# Patient Record
Sex: MALE | Race: WHITE | Employment: FULL TIME | URBAN - METROPOLITAN AREA
[De-identification: names, ages, dates, MRNs, and addresses within clinical notes are randomized per-mention and may not be internally consistent; named-entity substitution may affect disease eponyms.]

---

## 2018-11-05 ENCOUNTER — HOSPITAL ENCOUNTER (OUTPATIENT)
Dept: LAB | Age: 48
Discharge: HOME | End: 2018-11-05
Payer: COMMERCIAL

## 2018-11-05 LAB
ALBUMIN SERPL-MCNC: 4.4 G/DL (ref 3.9–4.9)
ALP BLD-CCNC: 56 U/L (ref 35–104)
ALT SERPL-CCNC: 26 U/L (ref 0–41)
ANION GAP SERPL CALCULATED.3IONS-SCNC: 13 MEQ/L (ref 7–13)
AST SERPL-CCNC: 18 U/L (ref 0–40)
BASOPHILS ABSOLUTE: 0 K/UL (ref 0–0.2)
BASOPHILS RELATIVE PERCENT: 0.6 %
BILIRUB SERPL-MCNC: 0.8 MG/DL (ref 0–1.2)
BUN BLDV-MCNC: 14 MG/DL (ref 6–20)
CALCIUM SERPL-MCNC: 9.3 MG/DL (ref 8.6–10.2)
CHLORIDE BLD-SCNC: 104 MEQ/L (ref 98–107)
CHOLESTEROL, TOTAL: 172 MG/DL (ref 0–199)
CO2: 23 MEQ/L (ref 22–29)
CREAT SERPL-MCNC: 0.68 MG/DL (ref 0.7–1.2)
EOSINOPHILS ABSOLUTE: 0.1 K/UL (ref 0–0.7)
EOSINOPHILS RELATIVE PERCENT: 1.2 %
GFR AFRICAN AMERICAN: >60
GFR NON-AFRICAN AMERICAN: >60
GLOBULIN: 2.6 G/DL (ref 2.3–3.5)
GLUCOSE BLD-MCNC: 99 MG/DL (ref 74–109)
HCT VFR BLD CALC: 44.7 % (ref 42–52)
HDLC SERPL-MCNC: 35 MG/DL (ref 40–59)
HEMOGLOBIN: 15.7 G/DL (ref 14–18)
LDL CHOLESTEROL CALCULATED: 120 MG/DL (ref 0–129)
LYMPHOCYTES ABSOLUTE: 1.8 K/UL (ref 1–4.8)
LYMPHOCYTES RELATIVE PERCENT: 25.7 %
MCH RBC QN AUTO: 31.8 PG (ref 27–31.3)
MCHC RBC AUTO-ENTMCNC: 35 % (ref 33–37)
MCV RBC AUTO: 90.9 FL (ref 80–100)
MONOCYTES ABSOLUTE: 0.6 K/UL (ref 0.2–0.8)
MONOCYTES RELATIVE PERCENT: 8.4 %
NEUTROPHILS ABSOLUTE: 4.4 K/UL (ref 1.4–6.5)
NEUTROPHILS RELATIVE PERCENT: 64.1 %
PDW BLD-RTO: 12.1 % (ref 11.5–14.5)
PLATELET # BLD: 320 K/UL (ref 130–400)
POTASSIUM SERPL-SCNC: 4.7 MEQ/L (ref 3.5–5.1)
RBC # BLD: 4.92 M/UL (ref 4.7–6.1)
SODIUM BLD-SCNC: 140 MEQ/L (ref 132–144)
T4 FREE: 1.16 NG/DL (ref 0.93–1.7)
TOTAL PROTEIN: 7 G/DL (ref 6.4–8.1)
TRIGL SERPL-MCNC: 85 MG/DL (ref 0–200)
TSH SERPL DL<=0.05 MIU/L-ACNC: 1.24 UIU/ML (ref 0.27–4.2)
VITAMIN D 25-HYDROXY: 19.1 NG/ML (ref 30–100)
WBC # BLD: 6.9 K/UL (ref 4.8–10.8)

## 2018-11-05 PROCEDURE — 80053 COMPREHEN METABOLIC PANEL: CPT

## 2018-11-05 PROCEDURE — 80061 LIPID PANEL: CPT

## 2018-11-05 PROCEDURE — 84403 ASSAY OF TOTAL TESTOSTERONE: CPT

## 2018-11-05 PROCEDURE — 85025 COMPLETE CBC W/AUTO DIFF WBC: CPT

## 2018-11-05 PROCEDURE — 84439 ASSAY OF FREE THYROXINE: CPT

## 2018-11-05 PROCEDURE — 36415 COLL VENOUS BLD VENIPUNCTURE: CPT

## 2018-11-05 PROCEDURE — 82306 VITAMIN D 25 HYDROXY: CPT

## 2018-11-05 PROCEDURE — 84443 ASSAY THYROID STIM HORMONE: CPT

## 2018-11-07 LAB — TESTOSTERONE TOTAL-MALE: 854 NG/DL (ref 300–890)

## 2019-06-17 ENCOUNTER — HOSPITAL ENCOUNTER (OUTPATIENT)
Dept: LAB | Age: 49
Discharge: HOME | End: 2019-06-17
Payer: COMMERCIAL

## 2019-06-17 LAB
ALBUMIN SERPL-MCNC: 4.7 G/DL (ref 3.5–4.6)
ALP BLD-CCNC: 61 U/L (ref 35–104)
ALT SERPL-CCNC: 28 U/L (ref 0–41)
ANION GAP SERPL CALCULATED.3IONS-SCNC: 12 MEQ/L (ref 9–15)
AST SERPL-CCNC: 18 U/L (ref 0–40)
BILIRUB SERPL-MCNC: 0.8 MG/DL (ref 0.2–0.7)
BUN BLDV-MCNC: 11 MG/DL (ref 6–20)
CALCIUM SERPL-MCNC: 9.1 MG/DL (ref 8.5–9.9)
CHLORIDE BLD-SCNC: 101 MEQ/L (ref 95–107)
CHOLESTEROL, TOTAL: 154 MG/DL (ref 0–199)
CO2: 25 MEQ/L (ref 20–31)
CREAT SERPL-MCNC: 0.64 MG/DL (ref 0.7–1.2)
GFR AFRICAN AMERICAN: >60
GFR NON-AFRICAN AMERICAN: >60
GLOBULIN: 2.9 G/DL (ref 2.3–3.5)
GLUCOSE BLD-MCNC: 116 MG/DL (ref 70–99)
HDLC SERPL-MCNC: 35 MG/DL (ref 40–59)
LDL CHOLESTEROL CALCULATED: 100 MG/DL (ref 0–129)
MAGNESIUM: 2.3 MG/DL (ref 1.7–2.4)
POTASSIUM SERPL-SCNC: 4.6 MEQ/L (ref 3.4–4.9)
SODIUM BLD-SCNC: 138 MEQ/L (ref 135–144)
T4 FREE: 1.15 NG/DL (ref 0.84–1.68)
TOTAL PROTEIN: 7.6 G/DL (ref 6.3–8)
TRIGL SERPL-MCNC: 94 MG/DL (ref 0–150)
TSH SERPL DL<=0.05 MIU/L-ACNC: 1.63 UIU/ML (ref 0.44–3.86)
VITAMIN D 25-HYDROXY: 37.1 NG/ML (ref 30–100)

## 2019-06-17 PROCEDURE — 80061 LIPID PANEL: CPT

## 2019-06-17 PROCEDURE — 84443 ASSAY THYROID STIM HORMONE: CPT

## 2019-06-17 PROCEDURE — 82306 VITAMIN D 25 HYDROXY: CPT

## 2019-06-17 PROCEDURE — 80053 COMPREHEN METABOLIC PANEL: CPT

## 2019-06-17 PROCEDURE — 83735 ASSAY OF MAGNESIUM: CPT

## 2019-06-17 PROCEDURE — 84439 ASSAY OF FREE THYROXINE: CPT

## 2019-06-17 PROCEDURE — 36415 COLL VENOUS BLD VENIPUNCTURE: CPT

## 2020-01-03 ENCOUNTER — HOSPITAL ENCOUNTER (OUTPATIENT)
Dept: LAB | Age: 50
Discharge: HOME | End: 2020-01-03
Payer: COMMERCIAL

## 2020-01-03 LAB
ALBUMIN SERPL-MCNC: 4.2 G/DL (ref 3.5–4.6)
ALP BLD-CCNC: 49 U/L (ref 35–104)
ALT SERPL-CCNC: 20 U/L (ref 0–41)
ANION GAP SERPL CALCULATED.3IONS-SCNC: 13 MEQ/L (ref 9–15)
AST SERPL-CCNC: 14 U/L (ref 0–40)
BILIRUB SERPL-MCNC: 1.2 MG/DL (ref 0.2–0.7)
BUN BLDV-MCNC: 16 MG/DL (ref 6–20)
CALCIUM SERPL-MCNC: 9.2 MG/DL (ref 8.5–9.9)
CHLORIDE BLD-SCNC: 99 MEQ/L (ref 95–107)
CHOLESTEROL, TOTAL: 158 MG/DL (ref 0–199)
CO2: 25 MEQ/L (ref 20–31)
CREAT SERPL-MCNC: 0.7 MG/DL (ref 0.7–1.2)
GFR AFRICAN AMERICAN: >60
GFR NON-AFRICAN AMERICAN: >60
GLOBULIN: 2.7 G/DL (ref 2.3–3.5)
GLUCOSE BLD-MCNC: 96 MG/DL (ref 70–99)
HDLC SERPL-MCNC: 32 MG/DL (ref 40–59)
LDL CHOLESTEROL CALCULATED: 102 MG/DL (ref 0–129)
POTASSIUM SERPL-SCNC: 4.4 MEQ/L (ref 3.4–4.9)
SODIUM BLD-SCNC: 137 MEQ/L (ref 135–144)
TOTAL PROTEIN: 6.9 G/DL (ref 6.3–8)
TRIGL SERPL-MCNC: 118 MG/DL (ref 0–150)

## 2020-01-03 PROCEDURE — 80061 LIPID PANEL: CPT

## 2020-01-03 PROCEDURE — 36415 COLL VENOUS BLD VENIPUNCTURE: CPT

## 2020-01-03 PROCEDURE — 80053 COMPREHEN METABOLIC PANEL: CPT

## 2020-12-29 ENCOUNTER — HOSPITAL ENCOUNTER (OUTPATIENT)
Dept: LAB | Age: 50
Discharge: HOME | End: 2020-12-29
Payer: COMMERCIAL

## 2020-12-29 LAB
ALBUMIN SERPL-MCNC: 4.4 G/DL (ref 3.5–4.6)
ALP BLD-CCNC: 56 U/L (ref 35–104)
ALT SERPL-CCNC: 23 U/L (ref 0–41)
ANION GAP SERPL CALCULATED.3IONS-SCNC: 11 MEQ/L (ref 9–15)
AST SERPL-CCNC: 17 U/L (ref 0–40)
BILIRUB SERPL-MCNC: 1.1 MG/DL (ref 0.2–0.7)
BUN BLDV-MCNC: 10 MG/DL (ref 6–20)
CALCIUM SERPL-MCNC: 9.6 MG/DL (ref 8.5–9.9)
CHLORIDE BLD-SCNC: 98 MEQ/L (ref 95–107)
CHOLESTEROL, TOTAL: 162 MG/DL (ref 0–199)
CO2: 30 MEQ/L (ref 20–31)
CREAT SERPL-MCNC: 0.82 MG/DL (ref 0.7–1.2)
GFR AFRICAN AMERICAN: >60
GFR NON-AFRICAN AMERICAN: >60
GLOBULIN: 2.6 G/DL (ref 2.3–3.5)
GLUCOSE BLD-MCNC: 91 MG/DL (ref 70–99)
HDLC SERPL-MCNC: 43 MG/DL (ref 40–59)
LDL CHOLESTEROL CALCULATED: 98 MG/DL (ref 0–129)
POTASSIUM SERPL-SCNC: 4.1 MEQ/L (ref 3.4–4.9)
SODIUM BLD-SCNC: 139 MEQ/L (ref 135–144)
TOTAL PROTEIN: 7 G/DL (ref 6.3–8)
TRIGL SERPL-MCNC: 106 MG/DL (ref 0–150)
VITAMIN D 25-HYDROXY: 23 NG/ML (ref 30–100)

## 2020-12-29 PROCEDURE — 82306 VITAMIN D 25 HYDROXY: CPT

## 2020-12-29 PROCEDURE — 80061 LIPID PANEL: CPT

## 2020-12-29 PROCEDURE — 36415 COLL VENOUS BLD VENIPUNCTURE: CPT

## 2020-12-29 PROCEDURE — 80053 COMPREHEN METABOLIC PANEL: CPT

## 2022-05-27 ENCOUNTER — HOSPITAL ENCOUNTER (OUTPATIENT)
Dept: LAB | Age: 52
Discharge: HOME | End: 2022-05-27
Payer: COMMERCIAL

## 2022-05-27 LAB
ALBUMIN SERPL-MCNC: 4.4 G/DL (ref 3.5–4.6)
ALP BLD-CCNC: 62 U/L (ref 35–104)
ALT SERPL-CCNC: 22 U/L (ref 0–41)
ANION GAP SERPL CALCULATED.3IONS-SCNC: 16 MEQ/L (ref 9–15)
AST SERPL-CCNC: 15 U/L (ref 0–40)
BASOPHILS ABSOLUTE: 0 K/UL (ref 0–0.2)
BASOPHILS RELATIVE PERCENT: 0.5 %
BILIRUB SERPL-MCNC: 0.8 MG/DL (ref 0.2–0.7)
BILIRUBIN DIRECT: <0.2 MG/DL (ref 0–0.4)
BILIRUBIN, INDIRECT: ABNORMAL MG/DL (ref 0–0.6)
BUN BLDV-MCNC: 13 MG/DL (ref 6–20)
CALCIUM SERPL-MCNC: 9 MG/DL (ref 8.5–9.9)
CHLORIDE BLD-SCNC: 98 MEQ/L (ref 95–107)
CHOLESTEROL, TOTAL: 189 MG/DL (ref 0–199)
CO2: 23 MEQ/L (ref 20–31)
CREAT SERPL-MCNC: 0.74 MG/DL (ref 0.7–1.2)
EOSINOPHILS ABSOLUTE: 0.1 K/UL (ref 0–0.7)
EOSINOPHILS RELATIVE PERCENT: 0.9 %
GFR AFRICAN AMERICAN: >60
GFR NON-AFRICAN AMERICAN: >60
GLOBULIN: 2.6 G/DL (ref 2.3–3.5)
GLUCOSE BLD-MCNC: 100 MG/DL (ref 70–99)
HCT VFR BLD CALC: 45.1 % (ref 42–52)
HDLC SERPL-MCNC: 35 MG/DL (ref 40–59)
HEMOGLOBIN: 15.2 G/DL (ref 14–18)
LDL CHOLESTEROL CALCULATED: 136 MG/DL (ref 0–129)
LYMPHOCYTES ABSOLUTE: 1.7 K/UL (ref 1–4.8)
LYMPHOCYTES RELATIVE PERCENT: 26.1 %
MCH RBC QN AUTO: 31.3 PG (ref 27–31.3)
MCHC RBC AUTO-ENTMCNC: 33.7 % (ref 33–37)
MCV RBC AUTO: 92.8 FL (ref 80–100)
MONOCYTES ABSOLUTE: 0.5 K/UL (ref 0.2–0.8)
MONOCYTES RELATIVE PERCENT: 7.5 %
NEUTROPHILS ABSOLUTE: 4.3 K/UL (ref 1.4–6.5)
NEUTROPHILS RELATIVE PERCENT: 65 %
PDW BLD-RTO: 12.1 % (ref 11.5–14.5)
PLATELET # BLD: 309 K/UL (ref 130–400)
POTASSIUM SERPL-SCNC: 3.8 MEQ/L (ref 3.4–4.9)
RBC # BLD: 4.86 M/UL (ref 4.7–6.1)
SODIUM BLD-SCNC: 137 MEQ/L (ref 135–144)
TOTAL PROTEIN: 7 G/DL (ref 6.3–8)
TRIGL SERPL-MCNC: 88 MG/DL (ref 0–150)
TSH SERPL DL<=0.05 MIU/L-ACNC: 0.98 UIU/ML (ref 0.44–3.86)
WBC # BLD: 6.6 K/UL (ref 4.8–10.8)

## 2022-05-27 PROCEDURE — 85025 COMPLETE CBC W/AUTO DIFF WBC: CPT

## 2022-05-27 PROCEDURE — 80061 LIPID PANEL: CPT

## 2022-05-27 PROCEDURE — 82248 BILIRUBIN DIRECT: CPT

## 2022-05-27 PROCEDURE — 84443 ASSAY THYROID STIM HORMONE: CPT

## 2022-05-27 PROCEDURE — 36415 COLL VENOUS BLD VENIPUNCTURE: CPT

## 2022-05-27 PROCEDURE — 80053 COMPREHEN METABOLIC PANEL: CPT

## 2023-07-06 ENCOUNTER — TELEPHONE (OUTPATIENT)
Dept: PRIMARY CARE | Facility: CLINIC | Age: 53
End: 2023-07-06

## 2023-07-06 ENCOUNTER — OFFICE VISIT (OUTPATIENT)
Dept: PRIMARY CARE | Facility: CLINIC | Age: 53
End: 2023-07-06
Payer: COMMERCIAL

## 2023-07-06 VITALS
HEIGHT: 70 IN | WEIGHT: 263 LBS | HEART RATE: 80 BPM | RESPIRATION RATE: 18 BRPM | BODY MASS INDEX: 37.65 KG/M2 | DIASTOLIC BLOOD PRESSURE: 72 MMHG | SYSTOLIC BLOOD PRESSURE: 104 MMHG | TEMPERATURE: 98 F | OXYGEN SATURATION: 97 %

## 2023-07-06 DIAGNOSIS — F32.89 OTHER DEPRESSION: ICD-10-CM

## 2023-07-06 DIAGNOSIS — R73.9 HYPERGLYCEMIA: ICD-10-CM

## 2023-07-06 DIAGNOSIS — E78.49 OTHER HYPERLIPIDEMIA: ICD-10-CM

## 2023-07-06 DIAGNOSIS — Z12.11 ENCOUNTER FOR SCREENING COLONOSCOPY: ICD-10-CM

## 2023-07-06 DIAGNOSIS — I10 PRIMARY HYPERTENSION: ICD-10-CM

## 2023-07-06 DIAGNOSIS — E55.9 VITAMIN D DEFICIENCY: ICD-10-CM

## 2023-07-06 DIAGNOSIS — E66.09 CLASS 2 OBESITY DUE TO EXCESS CALORIES IN ADULT, UNSPECIFIED BMI, UNSPECIFIED WHETHER SERIOUS COMORBIDITY PRESENT: ICD-10-CM

## 2023-07-06 DIAGNOSIS — Z12.5 SCREENING FOR PROSTATE CANCER: ICD-10-CM

## 2023-07-06 DIAGNOSIS — Z00.00 HEALTHCARE MAINTENANCE: Primary | ICD-10-CM

## 2023-07-06 DIAGNOSIS — R79.89 ELEVATED LIVER FUNCTION TESTS: ICD-10-CM

## 2023-07-06 PROBLEM — E78.5 HYPERLIPIDEMIA: Status: ACTIVE | Noted: 2023-07-06

## 2023-07-06 PROBLEM — E66.9 OBESITY: Status: ACTIVE | Noted: 2023-07-06

## 2023-07-06 PROBLEM — N52.9 ERECTILE DYSFUNCTION: Status: ACTIVE | Noted: 2023-07-06

## 2023-07-06 PROBLEM — R00.0 TACHYCARDIA: Status: ACTIVE | Noted: 2023-07-06

## 2023-07-06 LAB
POC APPEARANCE, URINE: CLEAR
POC BILIRUBIN, URINE: NEGATIVE
POC BLOOD, URINE: NEGATIVE
POC COLOR, URINE: YELLOW
POC GLUCOSE, URINE: NEGATIVE MG/DL
POC KETONES, URINE: NEGATIVE MG/DL
POC LEUKOCYTES, URINE: NEGATIVE
POC NITRITE,URINE: NEGATIVE
POC PH, URINE: 6.5 PH
POC PROTEIN, URINE: NEGATIVE MG/DL
POC SPECIFIC GRAVITY, URINE: 1.01
POC UROBILINOGEN, URINE: 1 EU/DL

## 2023-07-06 PROCEDURE — 3074F SYST BP LT 130 MM HG: CPT | Performed by: FAMILY MEDICINE

## 2023-07-06 PROCEDURE — 1036F TOBACCO NON-USER: CPT | Performed by: FAMILY MEDICINE

## 2023-07-06 PROCEDURE — 99396 PREV VISIT EST AGE 40-64: CPT | Performed by: FAMILY MEDICINE

## 2023-07-06 PROCEDURE — 3078F DIAST BP <80 MM HG: CPT | Performed by: FAMILY MEDICINE

## 2023-07-06 PROCEDURE — 81003 URINALYSIS AUTO W/O SCOPE: CPT | Performed by: FAMILY MEDICINE

## 2023-07-06 PROCEDURE — 99214 OFFICE O/P EST MOD 30 MIN: CPT | Performed by: FAMILY MEDICINE

## 2023-07-06 RX ORDER — BUPROPION HYDROCHLORIDE 300 MG/1
1 TABLET ORAL DAILY
COMMUNITY
Start: 2020-01-13 | End: 2023-07-06 | Stop reason: SDUPTHER

## 2023-07-06 RX ORDER — ACETAMINOPHEN 500 MG
TABLET ORAL
COMMUNITY

## 2023-07-06 RX ORDER — AMLODIPINE BESYLATE 5 MG/1
1 TABLET ORAL DAILY
COMMUNITY
Start: 2019-01-21 | End: 2023-07-06 | Stop reason: SDUPTHER

## 2023-07-06 RX ORDER — SEMAGLUTIDE 1.34 MG/ML
0.25 INJECTION, SOLUTION SUBCUTANEOUS
Qty: 1.5 ML | Refills: 6 | Status: SHIPPED | OUTPATIENT
Start: 2023-07-06 | End: 2023-07-06 | Stop reason: SDUPTHER

## 2023-07-06 RX ORDER — SEMAGLUTIDE 1.34 MG/ML
0.25 INJECTION, SOLUTION SUBCUTANEOUS
Qty: 1.5 ML | Refills: 6 | Status: SHIPPED | OUTPATIENT
Start: 2023-07-06 | End: 2023-07-10

## 2023-07-06 RX ORDER — LISINOPRIL AND HYDROCHLOROTHIAZIDE 12.5; 2 MG/1; MG/1
1 TABLET ORAL DAILY
Qty: 90 TABLET | Refills: 3 | Status: SHIPPED | OUTPATIENT
Start: 2023-07-06 | End: 2023-07-10 | Stop reason: SDUPTHER

## 2023-07-06 RX ORDER — LOVASTATIN 40 MG/1
1 TABLET ORAL DAILY
COMMUNITY
Start: 2019-01-21 | End: 2023-07-06 | Stop reason: SDUPTHER

## 2023-07-06 RX ORDER — LOVASTATIN 40 MG/1
40 TABLET ORAL DAILY
Qty: 90 TABLET | Refills: 3 | Status: SHIPPED | OUTPATIENT
Start: 2023-07-06 | End: 2023-07-10 | Stop reason: SDUPTHER

## 2023-07-06 RX ORDER — AMLODIPINE BESYLATE 5 MG/1
5 TABLET ORAL DAILY
Qty: 90 TABLET | Refills: 3 | Status: SHIPPED | OUTPATIENT
Start: 2023-07-06 | End: 2023-07-06 | Stop reason: SDUPTHER

## 2023-07-06 RX ORDER — AMLODIPINE BESYLATE 5 MG/1
5 TABLET ORAL DAILY
Qty: 90 TABLET | Refills: 3 | Status: SHIPPED | OUTPATIENT
Start: 2023-07-06 | End: 2023-07-10 | Stop reason: SDUPTHER

## 2023-07-06 RX ORDER — LISINOPRIL AND HYDROCHLOROTHIAZIDE 12.5; 2 MG/1; MG/1
1 TABLET ORAL DAILY
COMMUNITY
Start: 2019-01-21 | End: 2023-07-06 | Stop reason: SDUPTHER

## 2023-07-06 RX ORDER — BUPROPION HYDROCHLORIDE 300 MG/1
300 TABLET ORAL DAILY
Qty: 90 TABLET | Refills: 3 | Status: SHIPPED | OUTPATIENT
Start: 2023-07-06 | End: 2023-07-06

## 2023-07-06 RX ORDER — BUPROPION HYDROCHLORIDE 300 MG/1
300 TABLET ORAL DAILY
Qty: 90 TABLET | Refills: 3 | Status: SHIPPED | OUTPATIENT
Start: 2023-07-06 | End: 2023-07-10 | Stop reason: SDUPTHER

## 2023-07-06 RX ORDER — LISINOPRIL AND HYDROCHLOROTHIAZIDE 12.5; 2 MG/1; MG/1
1 TABLET ORAL DAILY
Qty: 90 TABLET | Refills: 3 | Status: SHIPPED | OUTPATIENT
Start: 2023-07-06 | End: 2023-07-06 | Stop reason: SDUPTHER

## 2023-07-06 NOTE — LETTER
July 6, 2023     Patient: Ion Bynum   YOB: 1970   Date of Visit: 7/6/2023       To Whom It May Concern:    Ion Bynum was seen in my clinic on 7/6/2023 at 12pm to get a physical?. Please excuse Ion for his absence from work on this day to make the appointment.    If you have any questions or concerns, please don't hesitate to call.         Sincerely,             Weston Shine, DO

## 2023-07-06 NOTE — PATIENT INSTRUCTIONS
Please consider exercise program involving walking or some other form of aerobic activity 5 days weekly for 30 minutes... Let's also consider strengthening of large muscle groups like the abdominal muscles or shoulder muscles... Twice weekly with reps of 5/10/15 exercises and gradually increase strength.. This is not heavy strength training but light weight training... Sit ups or back exercise routine.. Please ask for handout if uncertain how to do..This  will help to strengthen your muscles which in turn will help you to lose weight.... You might ask what is the best diet available.. I would strongly encourage you to consider  Weight Watchers.. And as  your  fellow on  Weight Watchers physician attempting to  live this  LIFE  style  choice with you....  I will be glad to give you recommendations on what to eat.. Consider buying Blanca bread.., mis bagle thin bread.. oikos yogurt... eggs  to eat as hard-boiled... Halo top ice cream for snack... All these are delicious foods which.. when eaten and  being compliant eating three  meals daily  breakfast lunch and dinner, drinking  64 ounces of water daily we will all win together !!!!!!!. This will be a means for you to lose weight... Consider also the smart phone praneeth ... My plate.. Or My  fitness  pal..,  as additional possibilities for weight loss... Good  lucclaudia Shine!    Discussed medication side effects.  The  risk benefits and treatment options  discussed with patient.       Please schedule follow-up appointment based upon your improvement/failure to improve/chronic medical conditions and physician recommendations during office appointment at the .       Patient advised to go to er if symptoms worsen or to call answering service, or to return to office for additional evaluation    This note was partially  generated using Dragon voice recognition and there may be incorrect words, wording, spelling, or pronunciation errors that were not  corrected prior to committing the note to the medical record.     Patient is immunizations and last tetanus 2019 and up-to-date.  Discussed shingles vaccine and patient may obtain from outside setting.  Reviewed HIV and hepatitis C testing.    Patient encouraged to get routine dental care.    Patient encouraged to sleep 8 hours nightly.    Exercise routine discussed please see wrap-up

## 2023-07-06 NOTE — ASSESSMENT & PLAN NOTE
Discussed colonoscopy patient prefers not to get at this time and would like to discuss next visit and will do so.  Reviewed

## 2023-07-06 NOTE — PROGRESS NOTES
Ion Bynum is a 53 y.o. male here today a periodic health exam.  I reviewed previous preventative health measures including screening tests, immunizations and labs.      HPI   Here for complete physical exam   Also  needsc refills  on  meds  Patient is also here for follow-up of hypertension.. Symptoms do not include headache confusion visual disturbance dizziness shortness of breath chest pain syncope or palpitations. Recent blood pressure patient has not been checking. By report there is good compliance with treatment. Pertinent medical history includes  /hyperlipidemia   Patient is also here for follow-up of hyperlipidemia. The most recent measurements for this patient would take it on..   At that time  results of lipids 5/22 showed cholesterol 189 triglycerides 88 HDL 35  liver tests kidney function within normal limitsAssociated symptoms do not include chest pain, Cramps with exertion, myalgias or nausea. Current treatment includes statins. By report there is good compliance with treatment. Pertinent medical history includes   hypertension   Patient was on bupropion for attempted weight loss.  Did not seem to help much although did lose weight from last visit  CURRENT COMPLAINTS OR CONCERNS:   Constitutional; no fever no chills no recent weight gain and no recent weight loss.  eyes:; no loss of vision no discharge from the eyes and no itching of the eyes.  ENT; no neck pain symptoms no ear symptoms and no nasal symptoms.  Cardiovascular; no chest pain no palpitations and no lower extremity edema.  Respiratory; no shortness of breath no cough and no shortness of breath during exertion.  gastrointestinal; no abdominal pain no constipation no heartburn no vomiting no blood in stools and bowel movement frequency normal.  genitourinary; negative dysuria no hematuria and no pyuria.  Musculoskeletal; no arthralgias and no myalgias.  integumentary;.. No skin lesions  Neurologic. no headaches and no  dizziness  hematologic/lymphatic; swollen glands no tendency for easy bleeding and no tendency for easy bruising  Psychiatric; no anxiety no depression and no emotional problems.       PREVIOUS PREVENTATIVE HEALTH  Colon cancer screening : NO  Cologuard : N/A  Mammogram : N/A  Pap Test :  N/A  PSA : NO  Hepatitis C Antibody : NO  HIV Screening : NO  Prevnar : NO  Tdap : YES -- DATE 1/21/19  Shingrix : NO  Yearly Flu Shot : NO    CURRENT FINDINGS  Healthy Diet : NO  Exercise :  YES --  golf   and hunting  Depression Issues : NO  Alcohol Use : NO  Tobacco Use : NO        Current Outpatient Medications:     amLODIPine (Norvasc) 5 mg tablet, Take 1 tablet (5 mg) by mouth once daily., Disp: 90 tablet, Rfl: 3    buPROPion XL (Wellbutrin XL) 300 mg 24 hr tablet, Take 1 tablet (300 mg) by mouth once daily., Disp: 90 tablet, Rfl: 3    cholecalciferol (Vitamin D-3) 50 mcg (2,000 unit) capsule, Take by mouth., Disp: , Rfl:     lisinopriL-hydrochlorothiazide 20-12.5 mg tablet, Take 1 tablet by mouth once daily., Disp: 90 tablet, Rfl: 3    lovastatin (Mevacor) 40 mg tablet, Take 1 tablet (40 mg) by mouth once daily., Disp: 90 tablet, Rfl: 3    semaglutide (Ozempic) 0.25 mg or 0.5 mg(2 mg/1.5 mL) pen injector, Inject 0.25 mg under the skin 1 (one) time per week., Disp: 1.5 mL, Rfl: 6    Past Medical History:   Diagnosis Date    Hyperlipidemia     Hypertension        Past Surgical History:   Procedure Laterality Date    OTHER SURGICAL HISTORY  09/30/2019    No history of surgery       No family history on file.  Dad...  pvd  Mother  ... Breast cancer  Brother leukemia  Social History     Tobacco Use    Smoking status: Never    Smokeless tobacco: Former     Types: Chew   Vaping Use    Vaping Use: Never used   Substance Use Topics    Alcohol use: Yes     Comment: occasional    Drug use: Never       Immunization History   Administered Date(s) Administered    TD (adult), 2 Lf tetanus toxoid, preservative free, adsorbed 01/21/2019     Tdap 08/21/2008         Objective    Visit Vitals  /72   Pulse 80   Temp 36.7 °C (98 °F)   Resp 18       Physical Exam  Vitals reviewed.   Constitutional:       Appearance: Normal appearance.   HENT:      Head: Normocephalic.      Right Ear: External ear normal.      Left Ear: External ear normal.      Nose: Nose normal.      Mouth/Throat:      Mouth: Mucous membranes are moist.   Eyes:      Conjunctiva/sclera: Conjunctivae normal.   Cardiovascular:      Rate and Rhythm: Regular rhythm.      Heart sounds: Normal heart sounds.   Pulmonary:      Effort: Pulmonary effort is normal.      Breath sounds: Normal breath sounds.   Abdominal:      General: Bowel sounds are normal.      Palpations: Abdomen is soft.   Musculoskeletal:         General: Normal range of motion.      Cervical back: Neck supple.   Skin:     General: Skin is warm and dry.   Neurological:      General: No focal deficit present.      Mental Status: He is alert and oriented to person, place, and time.   Psychiatric:         Behavior: Behavior normal.         Judgment: Judgment normal.            Assessment    1. Healthcare maintenance  Hepatitis C Antibody, HIV 1/2 Antigen/Antibody Screen with Reflex to Confirmation      2. Other hyperlipidemia  lovastatin (Mevacor) 40 mg tablet, Thyroxine, Free, Thyroid Stimulating Hormone      3. Primary hypertension  Comprehensive Metabolic Panel, Lipid Panel, Magnesium, POCT UA Automated manually resulted, amLODIPine (Norvasc) 5 mg tablet, lisinopriL-hydrochlorothiazide 20-12.5 mg tablet, DISCONTINUED: amLODIPine (Norvasc) 5 mg tablet, DISCONTINUED: lisinopriL-hydrochlorothiazide 20-12.5 mg tablet      4. Elevated liver function tests        5. Vitamin D deficiency        6. Screening for prostate cancer  Prostate Specific Antigen, Screen      7. Other depression  CBC and Auto Differential      8. Class 2 obesity due to excess calories in adult, unspecified BMI, unspecified whether serious comorbidity present   buPROPion XL (Wellbutrin XL) 300 mg 24 hr tablet, semaglutide (Ozempic) 0.25 mg or 0.5 mg(2 mg/1.5 mL) pen injector, DISCONTINUED: buPROPion XL (Wellbutrin XL) 300 mg 24 hr tablet, DISCONTINUED: semaglutide (Ozempic) 0.25 mg or 0.5 mg(2 mg/1.5 mL) pen injector, CANCELED: POCT glycosylated hemoglobin (Hb A1C) manually resulted      9. Encounter for screening colonoscopy        10. Hyperglycemia             Obesity  See wrap-up will try Ozempic in pharmacy to notify for medication management and tapering up of medication.    Hyperglycemia  Hemoglobin A1c checked today and numbers are 5.2 and nondiabetic discussed weight loss plan    Hypertension  Blood pressure appears stable with current dose of meds and continue along with potassium supplementation including kiwi/Halo/oranges/tomatoes/bananas for extra potassium    Hyperlipidemia  Order given to repeat lipids to confirm stability regarding cholesterol    Healthcare maintenance  Discussed colonoscopy patient prefers not to get at this time and would like to discuss next visit and will do so.  Reviewed

## 2023-07-06 NOTE — ASSESSMENT & PLAN NOTE
Blood pressure appears stable with current dose of meds and continue along with potassium supplementation including kiwi/Halo/oranges/tomatoes/bananas for extra potassium

## 2023-07-06 NOTE — ASSESSMENT & PLAN NOTE
See wrap-up will try Ozempic in pharmacy to notify for medication management and tapering up of medication.   36.9

## 2023-07-06 NOTE — LETTER
July 6, 2023     Patient: Ion Bynum   YOB: 1970   Date of Visit: 7/6/2023       To Whom It May Concern:    Ion Bynum was seen in my clinic on 7/6/2023 at 12pm?. Please excuse Ion for his absence from work on this day to make the appointment.    If you have any questions or concerns, please don't hesitate to call.         Sincerely,             Weston Shine, DO

## 2023-07-10 DIAGNOSIS — I10 PRIMARY HYPERTENSION: ICD-10-CM

## 2023-07-10 DIAGNOSIS — E66.09 CLASS 2 OBESITY DUE TO EXCESS CALORIES IN ADULT, UNSPECIFIED BMI, UNSPECIFIED WHETHER SERIOUS COMORBIDITY PRESENT: ICD-10-CM

## 2023-07-10 DIAGNOSIS — E78.49 OTHER HYPERLIPIDEMIA: ICD-10-CM

## 2023-07-10 RX ORDER — AMLODIPINE BESYLATE 5 MG/1
5 TABLET ORAL DAILY
Qty: 90 TABLET | Refills: 3 | Status: SHIPPED | OUTPATIENT
Start: 2023-07-10

## 2023-07-10 RX ORDER — LOVASTATIN 40 MG/1
40 TABLET ORAL DAILY
Qty: 90 TABLET | Refills: 3 | Status: SHIPPED | OUTPATIENT
Start: 2023-07-10

## 2023-07-10 RX ORDER — BUPROPION HYDROCHLORIDE 300 MG/1
300 TABLET ORAL DAILY
Qty: 90 TABLET | Refills: 3 | Status: SHIPPED | OUTPATIENT
Start: 2023-07-10

## 2023-07-10 RX ORDER — LISINOPRIL AND HYDROCHLOROTHIAZIDE 12.5; 2 MG/1; MG/1
1 TABLET ORAL DAILY
Qty: 90 TABLET | Refills: 3 | Status: SHIPPED | OUTPATIENT
Start: 2023-07-10

## 2024-07-03 ENCOUNTER — APPOINTMENT (OUTPATIENT)
Dept: PRIMARY CARE | Facility: CLINIC | Age: 54
End: 2024-07-03
Payer: COMMERCIAL

## 2024-07-03 VITALS
HEIGHT: 70 IN | OXYGEN SATURATION: 97 % | BODY MASS INDEX: 38.22 KG/M2 | HEART RATE: 100 BPM | DIASTOLIC BLOOD PRESSURE: 87 MMHG | WEIGHT: 267 LBS | SYSTOLIC BLOOD PRESSURE: 125 MMHG | TEMPERATURE: 97 F | RESPIRATION RATE: 18 BRPM

## 2024-07-03 DIAGNOSIS — Z12.5 SCREENING FOR PROSTATE CANCER: ICD-10-CM

## 2024-07-03 DIAGNOSIS — I10 PRIMARY HYPERTENSION: ICD-10-CM

## 2024-07-03 DIAGNOSIS — Z00.00 HEALTHCARE MAINTENANCE: Primary | ICD-10-CM

## 2024-07-03 DIAGNOSIS — Z12.11 SCREENING FOR COLON CANCER: ICD-10-CM

## 2024-07-03 DIAGNOSIS — E78.49 OTHER HYPERLIPIDEMIA: ICD-10-CM

## 2024-07-03 DIAGNOSIS — E55.9 VITAMIN D DEFICIENCY: ICD-10-CM

## 2024-07-03 DIAGNOSIS — E66.09 CLASS 2 OBESITY DUE TO EXCESS CALORIES IN ADULT, UNSPECIFIED BMI, UNSPECIFIED WHETHER SERIOUS COMORBIDITY PRESENT: ICD-10-CM

## 2024-07-03 PROCEDURE — 99396 PREV VISIT EST AGE 40-64: CPT | Performed by: FAMILY MEDICINE

## 2024-07-03 PROCEDURE — 3079F DIAST BP 80-89 MM HG: CPT | Performed by: FAMILY MEDICINE

## 2024-07-03 PROCEDURE — 1036F TOBACCO NON-USER: CPT | Performed by: FAMILY MEDICINE

## 2024-07-03 PROCEDURE — 3074F SYST BP LT 130 MM HG: CPT | Performed by: FAMILY MEDICINE

## 2024-07-03 RX ORDER — AMLODIPINE BESYLATE 5 MG/1
5 TABLET ORAL DAILY
Qty: 90 TABLET | Refills: 3 | Status: SHIPPED | OUTPATIENT
Start: 2024-07-03

## 2024-07-03 RX ORDER — LISINOPRIL AND HYDROCHLOROTHIAZIDE 12.5; 2 MG/1; MG/1
1 TABLET ORAL DAILY
Qty: 90 TABLET | Refills: 3 | Status: SHIPPED | OUTPATIENT
Start: 2024-07-03

## 2024-07-03 RX ORDER — LOVASTATIN 40 MG/1
40 TABLET ORAL DAILY
Qty: 90 TABLET | Refills: 3 | Status: SHIPPED | OUTPATIENT
Start: 2024-07-03

## 2024-07-03 RX ORDER — BUPROPION HYDROCHLORIDE 300 MG/1
300 TABLET ORAL DAILY
Qty: 90 TABLET | Refills: 3 | Status: SHIPPED | OUTPATIENT
Start: 2024-07-03

## 2024-07-03 ASSESSMENT — ENCOUNTER SYMPTOMS
NUMBNESS: 0
SPEECH DIFFICULTY: 0
CHEST TIGHTNESS: 0
VOICE CHANGE: 0
POLYDIPSIA: 0
DIZZINESS: 0
BRUISES/BLEEDS EASILY: 0
TROUBLE SWALLOWING: 0
BLOOD IN STOOL: 0
SORE THROAT: 0
SINUS PAIN: 0
CONSTIPATION: 0
BACK PAIN: 0
COUGH: 0
WEAKNESS: 0
HEADACHES: 0
DIARRHEA: 0
ARTHRALGIAS: 0
FATIGUE: 0
FREQUENCY: 0
ANAL BLEEDING: 0
MYALGIAS: 0
RECTAL PAIN: 0
HEMATURIA: 0
APPETITE CHANGE: 0
NAUSEA: 0
ABDOMINAL PAIN: 0

## 2024-07-03 NOTE — PROGRESS NOTES
Ion Bynum is a 54 y.o. male here today a periodic health exam.  I reviewed previous preventative health measures including screening tests, immunizations and labs.      HPI   CURRENT COMPLAINTS OR CONCERNS:   Doing ok        PREVIOUS PREVENTATIVE HEALTH  Colonoscopy : NO  Cologuard : YES -- DATE ordered today  PSA : NO  Hepatitis C Antibody : NO  HIV Screening : NO  Prevnar : N/A  Tdap : YES -- DATE 1/21/19  Shingrix : NO  Yearly Flu Shot : NO    CURRENT FINDINGS  Healthy Diet : NO  Exercise :  NO  Depression Issues : NO  Alcohol Use : YES --  occassional  Tobacco Use : NO        Current Outpatient Medications:     amLODIPine (Norvasc) 5 mg tablet, Take 1 tablet (5 mg) by mouth once daily., Disp: 90 tablet, Rfl: 3    buPROPion XL (Wellbutrin XL) 300 mg 24 hr tablet, Take 1 tablet (300 mg) by mouth once daily., Disp: 90 tablet, Rfl: 3    cholecalciferol (Vitamin D-3) 50 mcg (2,000 unit) capsule, Take by mouth., Disp: , Rfl:     lisinopriL-hydrochlorothiazide 20-12.5 mg tablet, Take 1 tablet by mouth once daily., Disp: 90 tablet, Rfl: 3    lovastatin (Mevacor) 40 mg tablet, Take 1 tablet (40 mg) by mouth once daily., Disp: 90 tablet, Rfl: 3    Past Medical History:   Diagnosis Date    Hyperlipidemia     Hypertension        Past Surgical History:   Procedure Laterality Date    OTHER SURGICAL HISTORY  09/30/2019    No history of surgery       No family history on file.    Social History     Tobacco Use    Smoking status: Never    Smokeless tobacco: Former     Types: Chew   Vaping Use    Vaping status: Never Used   Substance Use Topics    Alcohol use: Yes     Comment: occasional    Drug use: Never       Immunization History   Administered Date(s) Administered    Td vaccine, age 7 years and older (TDVAX) 01/21/2019    Tdap vaccine, age 7 year and older (BOOSTRIX, ADACEL) 08/21/2008       Review of Systems   Constitutional:  Negative for appetite change and fatigue.   HENT:  Negative for ear pain, mouth sores,  nosebleeds, sinus pain, sore throat, trouble swallowing and voice change.    Respiratory:  Negative for cough and chest tightness.    Cardiovascular:  Negative for chest pain.   Gastrointestinal:  Negative for abdominal pain, anal bleeding, blood in stool, constipation, diarrhea, nausea and rectal pain.   Endocrine: Negative for cold intolerance, heat intolerance, polydipsia and polyuria.   Genitourinary:  Negative for frequency, hematuria and testicular pain.   Musculoskeletal:  Negative for arthralgias, back pain, gait problem and myalgias.   Skin:  Negative for rash.   Neurological:  Negative for dizziness, speech difficulty, weakness, numbness and headaches.   Hematological:  Does not bruise/bleed easily.         Objective    Visit Vitals  /87   Pulse 100   Temp 36.1 °C (97 °F)   Resp 18       Physical Exam  Vitals reviewed.   Constitutional:       Appearance: Normal appearance.   HENT:      Head: Normocephalic.      Right Ear: External ear normal.      Left Ear: External ear normal.      Nose: Nose normal.      Mouth/Throat:      Mouth: Mucous membranes are moist.   Eyes:      Conjunctiva/sclera: Conjunctivae normal.   Cardiovascular:      Rate and Rhythm: Regular rhythm.      Heart sounds: Normal heart sounds.   Pulmonary:      Effort: Pulmonary effort is normal.      Breath sounds: Normal breath sounds.   Abdominal:      General: Bowel sounds are normal.      Palpations: Abdomen is soft.   Musculoskeletal:         General: Normal range of motion.      Cervical back: Neck supple.   Skin:     General: Skin is warm and dry.   Neurological:      General: No focal deficit present.      Mental Status: He is alert and oriented to person, place, and time.   Psychiatric:         Behavior: Behavior normal.         Judgment: Judgment normal.            Assessment    1. Healthcare maintenance        2. Other hyperlipidemia  lovastatin (Mevacor) 40 mg tablet, Comprehensive Metabolic Panel, Lipid Panel, Creatine  Kinase      3. Class 2 obesity due to excess calories in adult, unspecified BMI, unspecified whether serious comorbidity present  buPROPion XL (Wellbutrin XL) 300 mg 24 hr tablet      4. Primary hypertension  amLODIPine (Norvasc) 5 mg tablet, lisinopriL-hydrochlorothiazide 20-12.5 mg tablet, CBC and Auto Differential      5. Screening for colon cancer  Cologuard® colon cancer screening, Cologuard® colon cancer screening      6. Screening for prostate cancer  Prostate Specific Antigen, Screen      7. Vitamin D deficiency  Vitamin D 25-Hydroxy,Total (for eval of Vitamin D levels)

## 2024-07-03 NOTE — ASSESSMENT & PLAN NOTE
Check  labs  Blood  pressure  is   stable Monitor your blood pressure at home and notify if blood pressure consistently over 140 systolic 90 diastolic   See dentist twice yearly  Get checked yearly  30 minutes of moderate intensity exercise 5 days weekly  Application of sunscreen every 2 hours during peak times  Regular sleep schedule  At least 8 hours nightly of sleep..  Limit TV/screen time 30 minutes prior to bedtime

## 2024-07-08 DIAGNOSIS — I10 PRIMARY HYPERTENSION: ICD-10-CM

## 2024-07-08 DIAGNOSIS — E66.09 CLASS 2 OBESITY DUE TO EXCESS CALORIES IN ADULT, UNSPECIFIED BMI, UNSPECIFIED WHETHER SERIOUS COMORBIDITY PRESENT: ICD-10-CM

## 2024-07-08 DIAGNOSIS — E78.49 OTHER HYPERLIPIDEMIA: ICD-10-CM

## 2024-07-10 RX ORDER — BUPROPION HYDROCHLORIDE 300 MG/1
300 TABLET ORAL DAILY
Qty: 90 TABLET | Refills: 3 | Status: SHIPPED | OUTPATIENT
Start: 2024-07-10

## 2024-07-10 RX ORDER — LISINOPRIL AND HYDROCHLOROTHIAZIDE 12.5; 2 MG/1; MG/1
1 TABLET ORAL DAILY
Qty: 90 TABLET | Refills: 3 | Status: SHIPPED | OUTPATIENT
Start: 2024-07-10

## 2024-07-10 RX ORDER — AMLODIPINE BESYLATE 5 MG/1
5 TABLET ORAL DAILY
Qty: 90 TABLET | Refills: 3 | Status: SHIPPED | OUTPATIENT
Start: 2024-07-10

## 2024-07-10 RX ORDER — LOVASTATIN 40 MG/1
40 TABLET ORAL DAILY
Qty: 90 TABLET | Refills: 3 | Status: SHIPPED | OUTPATIENT
Start: 2024-07-10

## 2024-07-12 LAB — NONINV COLON CA DNA+OCC BLD SCRN STL QL: NEGATIVE

## 2025-04-02 ENCOUNTER — HOSPITAL ENCOUNTER (OUTPATIENT)
Dept: LAB | Age: 55
Discharge: HOME | End: 2025-04-02
Payer: COMMERCIAL

## 2025-04-02 LAB
ALBUMIN SERPL-MCNC: 4.3 G/DL (ref 3.5–4.6)
ALP SERPL-CCNC: 58 U/L (ref 35–104)
ALT SERPL-CCNC: 20 U/L (ref 0–41)
ANION GAP SERPL CALCULATED.3IONS-SCNC: 10 MEQ/L (ref 9–15)
AST SERPL-CCNC: 18 U/L (ref 0–40)
BASOPHILS # BLD: 0 K/UL (ref 0–0.2)
BASOPHILS NFR BLD: 0.7 %
BILIRUB SERPL-MCNC: 1.2 MG/DL (ref 0.2–0.7)
BUN SERPL-MCNC: 14 MG/DL (ref 6–20)
CALCIUM SERPL-MCNC: 9.2 MG/DL (ref 8.5–9.9)
CHLORIDE SERPL-SCNC: 101 MEQ/L (ref 95–107)
CHOLEST SERPL-MCNC: 167 MG/DL (ref 0–199)
CK SERPL-CCNC: 124 U/L (ref 0–190)
CO2 SERPL-SCNC: 28 MEQ/L (ref 20–31)
CREAT SERPL-MCNC: 0.75 MG/DL (ref 0.7–1.2)
EOSINOPHIL # BLD: 0.1 K/UL (ref 0–0.7)
EOSINOPHIL NFR BLD: 1 %
ERYTHROCYTE [DISTWIDTH] IN BLOOD BY AUTOMATED COUNT: 11.9 % (ref 11.5–14.5)
GLOBULIN SER CALC-MCNC: 3 G/DL (ref 2.3–3.5)
GLUCOSE SERPL-MCNC: 105 MG/DL (ref 70–99)
HCT VFR BLD AUTO: 45.2 % (ref 42–52)
HDLC SERPL-MCNC: 32 MG/DL (ref 40–59)
HGB BLD-MCNC: 15.2 G/DL (ref 14–18)
LDLC SERPL CALC-MCNC: 118 MG/DL (ref 0–129)
LYMPHOCYTES # BLD: 1.4 K/UL (ref 1–4.8)
LYMPHOCYTES NFR BLD: 22.4 %
MCH RBC QN AUTO: 30.5 PG (ref 27–31.3)
MCHC RBC AUTO-ENTMCNC: 33.6 % (ref 33–37)
MCV RBC AUTO: 90.6 FL (ref 79–92.2)
MONOCYTES # BLD: 0.6 K/UL (ref 0.2–0.8)
MONOCYTES NFR BLD: 9.2 %
NEUTROPHILS # BLD: 4 K/UL (ref 1.4–6.5)
NEUTS SEG NFR BLD: 66.5 %
PLATELET # BLD AUTO: 333 K/UL (ref 130–400)
POTASSIUM SERPL-SCNC: 4.3 MEQ/L (ref 3.4–4.9)
PROT SERPL-MCNC: 7.3 G/DL (ref 6.3–8)
PSA SERPL-MCNC: 0.29 NG/ML (ref 0–4)
RBC # BLD AUTO: 4.99 M/UL (ref 4.7–6.1)
SODIUM SERPL-SCNC: 139 MEQ/L (ref 135–144)
TRIGL SERPL-MCNC: 86 MG/DL (ref 0–150)
VITAMIN D 25-HYDROXY: 29.5 NG/ML (ref 30–100)
WBC # BLD AUTO: 6.1 K/UL (ref 4.8–10.8)

## 2025-04-02 PROCEDURE — 82550 ASSAY OF CK (CPK): CPT

## 2025-04-02 PROCEDURE — 84153 ASSAY OF PSA TOTAL: CPT

## 2025-04-02 PROCEDURE — 80053 COMPREHEN METABOLIC PANEL: CPT

## 2025-04-02 PROCEDURE — 36415 COLL VENOUS BLD VENIPUNCTURE: CPT

## 2025-04-02 PROCEDURE — 82306 VITAMIN D 25 HYDROXY: CPT

## 2025-04-02 PROCEDURE — 80061 LIPID PANEL: CPT

## 2025-04-02 PROCEDURE — 85025 COMPLETE CBC W/AUTO DIFF WBC: CPT

## 2025-06-19 ENCOUNTER — APPOINTMENT (OUTPATIENT)
Dept: PRIMARY CARE | Facility: CLINIC | Age: 55
End: 2025-06-19
Payer: COMMERCIAL

## 2025-06-19 VITALS
OXYGEN SATURATION: 97 % | BODY MASS INDEX: 36.88 KG/M2 | WEIGHT: 257 LBS | HEART RATE: 88 BPM | RESPIRATION RATE: 18 BRPM | SYSTOLIC BLOOD PRESSURE: 119 MMHG | DIASTOLIC BLOOD PRESSURE: 82 MMHG | TEMPERATURE: 98.7 F

## 2025-06-19 DIAGNOSIS — E66.09 CLASS 2 OBESITY DUE TO EXCESS CALORIES IN ADULT, UNSPECIFIED BMI, UNSPECIFIED WHETHER SERIOUS COMORBIDITY PRESENT: ICD-10-CM

## 2025-06-19 DIAGNOSIS — E78.49 OTHER HYPERLIPIDEMIA: ICD-10-CM

## 2025-06-19 DIAGNOSIS — F32.89 OTHER DEPRESSION: ICD-10-CM

## 2025-06-19 DIAGNOSIS — E66.812 CLASS 2 OBESITY DUE TO EXCESS CALORIES IN ADULT, UNSPECIFIED BMI, UNSPECIFIED WHETHER SERIOUS COMORBIDITY PRESENT: ICD-10-CM

## 2025-06-19 DIAGNOSIS — Z00.00 HEALTHCARE MAINTENANCE: ICD-10-CM

## 2025-06-19 DIAGNOSIS — I10 PRIMARY HYPERTENSION: ICD-10-CM

## 2025-06-19 DIAGNOSIS — R73.9 HYPERGLYCEMIA: Primary | ICD-10-CM

## 2025-06-19 LAB — POC HEMOGLOBIN A1C: 5.5 % (ref 4.2–6.5)

## 2025-06-19 RX ORDER — BUPROPION HYDROCHLORIDE 300 MG/1
300 TABLET ORAL DAILY
Qty: 90 TABLET | Refills: 3 | Status: SHIPPED | OUTPATIENT
Start: 2025-06-19 | End: 2025-06-19 | Stop reason: WASHOUT

## 2025-06-19 RX ORDER — LISINOPRIL AND HYDROCHLOROTHIAZIDE 12.5; 2 MG/1; MG/1
1 TABLET ORAL DAILY
Qty: 90 TABLET | Refills: 3 | Status: SHIPPED | OUTPATIENT
Start: 2025-06-19

## 2025-06-19 RX ORDER — BUPROPION HYDROCHLORIDE 150 MG/1
150 TABLET ORAL EVERY MORNING
Qty: 90 TABLET | Refills: 1 | Status: SHIPPED | OUTPATIENT
Start: 2025-06-19 | End: 2025-08-18

## 2025-06-19 RX ORDER — LOVASTATIN 40 MG/1
40 TABLET ORAL DAILY
Qty: 90 TABLET | Refills: 3 | Status: SHIPPED | OUTPATIENT
Start: 2025-06-19

## 2025-06-19 RX ORDER — AMLODIPINE BESYLATE 5 MG/1
5 TABLET ORAL DAILY
Qty: 90 TABLET | Refills: 3 | Status: SHIPPED | OUTPATIENT
Start: 2025-06-19

## 2025-06-19 ASSESSMENT — ENCOUNTER SYMPTOMS
COUGH: 0
SORE THROAT: 0
ANAL BLEEDING: 0
HEMATURIA: 0
ARTHRALGIAS: 0
WHEEZING: 0
NECK PAIN: 0
PHOTOPHOBIA: 0
ABDOMINAL PAIN: 0
TROUBLE SWALLOWING: 0
DIARRHEA: 0
APNEA: 0
BACK PAIN: 0
FREQUENCY: 0
CHEST TIGHTNESS: 0
BLOOD IN STOOL: 0
SPEECH DIFFICULTY: 1
SINUS PAIN: 0
POLYDIPSIA: 0
PALPITATIONS: 0
CONSTIPATION: 0
POLYPHAGIA: 0
SHORTNESS OF BREATH: 0
DIZZINESS: 0
NAUSEA: 0
FATIGUE: 0
BRUISES/BLEEDS EASILY: 0

## 2025-06-19 ASSESSMENT — PATIENT HEALTH QUESTIONNAIRE - PHQ9
1. LITTLE INTEREST OR PLEASURE IN DOING THINGS: NOT AT ALL
SUM OF ALL RESPONSES TO PHQ9 QUESTIONS 1 AND 2: 0
2. FEELING DOWN, DEPRESSED OR HOPELESS: NOT AT ALL

## 2025-06-19 NOTE — ASSESSMENT & PLAN NOTE
Patient is also here for follow-up of hyperlipidemia. The most recent measurements for this patient would take it on..  Lipids dated 4/2/2025 showed cholesterol 167 triglycerides 86 HDL 32  with liver testing showing normal liver function studies and normal kidney function L    triglycerides       .... Associated symptoms do not include chest pain, Cramps with exertion, myalgias or nausea. Current treatment includes statins. By report there is good compliance with treatment. Pertinent medical history includes   hypertension   stable and continue meds

## 2025-06-19 NOTE — ASSESSMENT & PLAN NOTE
Patient is also here for follow-up of hypertension.. Symptoms do not include headache confusion visual disturbance dizziness shortness of breath chest pain syncope or palpitations. Recent blood pressure patient has   been checking.   .120/80 By report there is good compliance with treatment. Pertinent medical history includes  /hyperlipidemia   stable and continue meds

## 2025-06-19 NOTE — PROGRESS NOTES
Ion Bynum is a 54 y.o. male here today a periodic health exam.  I reviewed previous preventative health measures including screening tests, immunizations and labs.      HPI   CURRENT COMPLAINTS OR CONCERNS:   Patient is also here for follow-up of hypertension.. Symptoms do not include headache confusion visual disturbance dizziness shortness of breath chest pain syncope or palpitations. Recent blood pressure patient has   been checking.   .120/80 By report there is good compliance with treatment. Pertinent medical history includes  /hyperlipidemia   Patient is also here for follow-up of hyperlipidemia. The most recent measurements for this patient would take it on..  Lipids dated 4/2/2025 showed cholesterol 167 triglycerides 86 HDL 32  with liver testing showing normal liver function studies and normal kidney function L    triglycerides       .... Associated symptoms do not include chest pain, Cramps with exertion, myalgias or nausea. Current treatment includes statins. By report there is good compliance with treatment. Pertinent medical history includes   hypertension     PREVIOUS PREVENTATIVE HEALTH  Colonoscopy : NO  Cologuard : YES -- DATE 7/5/24  PSA : YES -- DATE PSA 4/2/2025 0.29  Hepatitis C Antibody : NO  HIV Screening : NO  Prevnar : NO  Tdap : YES -- DATE 1/21/2019  Shingrix : NO  Yearly Flu Shot : NO    CURRENT FINDINGS  Healthy Diet : YES  gradually    weight   loss  Exercise :  YES --  hunting  and hiking  Depression Issues : NO  Alcohol Use : YES --  occassional  Tobacco Use : NO      Current Medications[1]    Medical History[2]    Surgical History[3]    Family History[4]    Social History[5]    Immunization History   Administered Date(s) Administered    Td vaccine, age 7 years and older (TDVAX) 01/21/2019    Tdap vaccine, age 7 year and older (BOOSTRIX, ADACEL) 08/21/2008       Review of Systems   Constitutional:  Negative for fatigue.   HENT:  Negative for ear pain, sinus pain, sore  throat and trouble swallowing.    Eyes:  Negative for photophobia.   Respiratory:  Negative for apnea, cough, chest tightness, shortness of breath and wheezing.    Cardiovascular:  Negative for chest pain, palpitations and leg swelling.   Gastrointestinal:  Negative for abdominal pain, anal bleeding, blood in stool, constipation, diarrhea and nausea.   Endocrine: Negative for cold intolerance, heat intolerance, polydipsia, polyphagia and polyuria.   Genitourinary:  Negative for frequency and hematuria.   Musculoskeletal:  Negative for arthralgias, back pain and neck pain.   Skin:  Negative for rash.   Neurological:  Positive for speech difficulty. Negative for dizziness.   Hematological:  Does not bruise/bleed easily.         Objective    Visit Vitals  /82   Pulse 88   Temp 37.1 °C (98.7 °F)   Resp 18       Physical Exam  Vitals reviewed.   Constitutional:       Appearance: Normal appearance.   HENT:      Head: Normocephalic.      Right Ear: External ear normal.      Left Ear: External ear normal.      Nose: Nose normal.      Mouth/Throat:      Mouth: Mucous membranes are moist.   Eyes:      Conjunctiva/sclera: Conjunctivae normal.   Cardiovascular:      Rate and Rhythm: Regular rhythm.      Heart sounds: Normal heart sounds.   Pulmonary:      Effort: Pulmonary effort is normal.      Breath sounds: Normal breath sounds.   Abdominal:      General: Bowel sounds are normal.      Palpations: Abdomen is soft.   Musculoskeletal:         General: Normal range of motion.      Cervical back: Neck supple.   Skin:     General: Skin is warm and dry.   Neurological:      General: No focal deficit present.      Mental Status: He is alert and oriented to person, place, and time.   Psychiatric:         Behavior: Behavior normal.         Judgment: Judgment normal.            Assessment    1. Hyperglycemia  POCT glycosylated hemoglobin (Hb A1C) manually resulted      2. Primary hypertension  amLODIPine (Norvasc) 5 mg tablet,  lisinopriL-hydrochlorothiazide 20-12.5 mg tablet, ECG 12 Lead, CBC and Auto Differential, Magnesium, CBC and Auto Differential, Magnesium      3. Class 2 obesity due to excess calories in adult, unspecified BMI, unspecified whether serious comorbidity present  DISCONTINUED: buPROPion XL (Wellbutrin XL) 300 mg 24 hr tablet      4. Other hyperlipidemia  lovastatin (Mevacor) 40 mg tablet, Comprehensive Metabolic Panel, Lipid Panel, Comprehensive Metabolic Panel, Lipid Panel      5. Other depression  buPROPion XL (Wellbutrin XL) 150 mg 24 hr tablet      6. Healthcare maintenance        Discussed tapering of Wellbutrin 150 mg..... And.  Further tapering  Blood  pressure  is   stable Monitor your blood pressure at home and notify if blood pressure consistently over 140 systolic 90 diastolic   See dentist twice yearly  Get checked yearly  30 minutes of moderate intensity exercise 5 days weekly  Application of sunscreen every 2 hours during peak times  Regular sleep schedule  At least 8 hours nightly of sleep..  Limit TV/screen time 30 minutes prior to bedtime     EKG shows  Normal sinus rhythm  Normal axis  Nonspecific ST segment changes  Poor R wave progression   ms  No EKG for comparison    Hemoglobin A1c 5.5 and not diabetic see wrap-up for weight loss..       [1]   Current Outpatient Medications:     cholecalciferol (Vitamin D-3) 50 mcg (2,000 unit) capsule, Take by mouth., Disp: , Rfl:     amLODIPine (Norvasc) 5 mg tablet, Take 1 tablet (5 mg) by mouth once daily., Disp: 90 tablet, Rfl: 3    buPROPion XL (Wellbutrin XL) 150 mg 24 hr tablet, Take 1 tablet (150 mg) by mouth once daily in the morning. Do not crush, chew, or split., Disp: 90 tablet, Rfl: 1    lisinopriL-hydrochlorothiazide 20-12.5 mg tablet, Take 1 tablet by mouth once daily., Disp: 90 tablet, Rfl: 3    lovastatin (Mevacor) 40 mg tablet, Take 1 tablet (40 mg) by mouth once daily., Disp: 90 tablet, Rfl: 3  [2]   Past Medical History:  Diagnosis Date     Hyperlipidemia     Hypertension    [3]   Past Surgical History:  Procedure Laterality Date    OTHER SURGICAL HISTORY  09/30/2019    No history of surgery   [4]   Family History  Problem Relation Name Age of Onset    Peripheral vascular disease Father      Leukemia Brother     [5]   Social History  Tobacco Use    Smoking status: Never    Smokeless tobacco: Former     Types: Chew   Vaping Use    Vaping status: Never Used   Substance Use Topics    Alcohol use: Yes     Comment: occasional...one  monthly    Drug use: Never

## 2025-06-19 NOTE — PATIENT INSTRUCTIONS
Get  shingles        Please consider exercise program involving walking or some other form of aerobic activity 5 days weekly for 30 minutes... Let's also consider strengthening of large muscle groups like the abdominal muscles or shoulder muscles... Twice weekly with reps of 5/10/15 exercises and gradually increase strength.. This is not heavy strength training but light weight training... Sit ups or back exercise routine.. Please ask for handout if uncertain how to do..This  will help to strengthen your muscles which in turn will help you to lose weight.... You might ask what is the best diet available.. I would strongly encourage you to consider  Weight Watchers.. And as  your  fellow on  Weight Watchers physician attempting to  live this  LIFE  style  choice with you....  I will be glad to give you recommendations on what to eat.. Consider buying Blanca bread.., mis bagle thin bread.. oikos yogurt... eggs  to eat as hard-boiled... Halo top ice cream for snack... All these are delicious foods which.. when eaten and  being compliant eating three  meals daily  breakfast lunch and dinner, drinking  64 ounces of water daily we will all win together !!!!!!!. This will be a means for you to lose weight... Consider also the smart phone praneeth ... My plate.. Or My  fitness  pal..,  as additional possibilities for weight loss... Vasile Shine!    Discussed medication side effects.  The  risk benefits and treatment options  discussed with patient.       Please schedule follow-up appointment based upon your improvement/failure to improve/chronic medical conditions and physician recommendations during office appointment at the .       Patient advised to go to er if symptoms worsen or to call answering service, or to return to office for additional evaluation    This note was partially  generated using Dragon voice recognition and there may be incorrect words, wording, spelling, or pronunciation errors  that were not corrected prior to committing the note to the medical record.

## 2025-06-19 NOTE — ASSESSMENT & PLAN NOTE
Blood  pressure  is   stable Monitor your blood pressure at home and notify if blood pressure consistently over 140 systolic 90 diastolic   See dentist twice yearly  Get checked yearly  30 minutes of moderate intensity exercise 5 days weekly  Application of sunscreen every 2 hours during peak times  Regular sleep schedule  At least 8 hours nightly of sleep..  Limit TV/screen time 30 minutes prior to bedtime v

## 2026-06-19 ENCOUNTER — APPOINTMENT (OUTPATIENT)
Dept: PRIMARY CARE | Facility: CLINIC | Age: 56
End: 2026-06-19
Payer: COMMERCIAL